# Patient Record
Sex: FEMALE | Race: WHITE
[De-identification: names, ages, dates, MRNs, and addresses within clinical notes are randomized per-mention and may not be internally consistent; named-entity substitution may affect disease eponyms.]

---

## 2019-08-10 ENCOUNTER — HOSPITAL ENCOUNTER (INPATIENT)
Dept: HOSPITAL 92 - ERS | Age: 59
LOS: 3 days | Discharge: HOME | DRG: 66 | End: 2019-08-13
Attending: HOSPITALIST | Admitting: HOSPITALIST
Payer: COMMERCIAL

## 2019-08-10 VITALS — BODY MASS INDEX: 32.1 KG/M2

## 2019-08-10 DIAGNOSIS — H53.462: ICD-10-CM

## 2019-08-10 DIAGNOSIS — Z90.49: ICD-10-CM

## 2019-08-10 DIAGNOSIS — F90.9: ICD-10-CM

## 2019-08-10 DIAGNOSIS — F41.9: ICD-10-CM

## 2019-08-10 DIAGNOSIS — R26.81: ICD-10-CM

## 2019-08-10 DIAGNOSIS — I10: ICD-10-CM

## 2019-08-10 DIAGNOSIS — Z79.899: ICD-10-CM

## 2019-08-10 DIAGNOSIS — G43.909: ICD-10-CM

## 2019-08-10 DIAGNOSIS — E11.65: ICD-10-CM

## 2019-08-10 DIAGNOSIS — E03.9: ICD-10-CM

## 2019-08-10 DIAGNOSIS — I63.531: Primary | ICD-10-CM

## 2019-08-10 DIAGNOSIS — R20.0: ICD-10-CM

## 2019-08-10 DIAGNOSIS — Z79.4: ICD-10-CM

## 2019-08-10 DIAGNOSIS — E66.9: ICD-10-CM

## 2019-08-10 DIAGNOSIS — E78.5: ICD-10-CM

## 2019-08-10 DIAGNOSIS — Z79.84: ICD-10-CM

## 2019-08-10 DIAGNOSIS — Z98.51: ICD-10-CM

## 2019-08-10 LAB
ALBUMIN SERPL BCG-MCNC: 4.4 G/DL (ref 3.5–5)
ALP SERPL-CCNC: 81 U/L (ref 40–150)
ALT SERPL W P-5'-P-CCNC: 23 U/L (ref 8–55)
ANION GAP SERPL CALC-SCNC: 11 MMOL/L (ref 10–20)
AST SERPL-CCNC: 15 U/L (ref 5–34)
BASOPHILS # BLD AUTO: 0.1 THOU/UL (ref 0–0.2)
BASOPHILS NFR BLD AUTO: 0.7 % (ref 0–1)
BILIRUB SERPL-MCNC: 0.4 MG/DL (ref 0.2–1.2)
BUN SERPL-MCNC: 15 MG/DL (ref 9.8–20.1)
CALCIUM SERPL-MCNC: 9.4 MG/DL (ref 7.8–10.44)
CHLORIDE SERPL-SCNC: 101 MMOL/L (ref 98–107)
CO2 SERPL-SCNC: 27 MMOL/L (ref 22–29)
CREAT CL PREDICTED SERPL C-G-VRATE: 0 ML/MIN (ref 70–130)
EOSINOPHIL # BLD AUTO: 0.2 THOU/UL (ref 0–0.7)
EOSINOPHIL NFR BLD AUTO: 1.7 % (ref 0–10)
GLOBULIN SER CALC-MCNC: 2.6 G/DL (ref 2.4–3.5)
GLUCOSE SERPL-MCNC: 305 MG/DL (ref 70–105)
HGB BLD-MCNC: 15.8 G/DL (ref 12–16)
LYMPHOCYTES # BLD: 2.9 THOU/UL (ref 1.2–3.4)
LYMPHOCYTES NFR BLD AUTO: 26.9 % (ref 21–51)
MCH RBC QN AUTO: 31.7 PG (ref 27–31)
MCV RBC AUTO: 93.6 FL (ref 78–98)
MONOCYTES # BLD AUTO: 0.6 THOU/UL (ref 0.11–0.59)
MONOCYTES NFR BLD AUTO: 5.2 % (ref 0–10)
NEUTROPHILS # BLD AUTO: 7.1 THOU/UL (ref 1.4–6.5)
NEUTROPHILS NFR BLD AUTO: 65.4 % (ref 42–75)
PLATELET # BLD AUTO: 251 THOU/UL (ref 130–400)
POTASSIUM SERPL-SCNC: 4.2 MMOL/L (ref 3.5–5.1)
RBC # BLD AUTO: 4.99 MILL/UL (ref 4.2–5.4)
SODIUM SERPL-SCNC: 135 MMOL/L (ref 136–145)
WBC # BLD AUTO: 10.9 THOU/UL (ref 4.8–10.8)

## 2019-08-10 PROCEDURE — 83036 HEMOGLOBIN GLYCOSYLATED A1C: CPT

## 2019-08-10 PROCEDURE — 70551 MRI BRAIN STEM W/O DYE: CPT

## 2019-08-10 PROCEDURE — 36416 COLLJ CAPILLARY BLOOD SPEC: CPT

## 2019-08-10 PROCEDURE — 93880 EXTRACRANIAL BILAT STUDY: CPT

## 2019-08-10 PROCEDURE — 96365 THER/PROPH/DIAG IV INF INIT: CPT

## 2019-08-10 PROCEDURE — 80048 BASIC METABOLIC PNL TOTAL CA: CPT

## 2019-08-10 PROCEDURE — 80061 LIPID PANEL: CPT

## 2019-08-10 PROCEDURE — 80053 COMPREHEN METABOLIC PANEL: CPT

## 2019-08-10 PROCEDURE — 85025 COMPLETE CBC W/AUTO DIFF WBC: CPT

## 2019-08-10 PROCEDURE — 36415 COLL VENOUS BLD VENIPUNCTURE: CPT

## 2019-08-10 PROCEDURE — 96375 TX/PRO/DX INJ NEW DRUG ADDON: CPT

## 2019-08-10 PROCEDURE — 84484 ASSAY OF TROPONIN QUANT: CPT

## 2019-08-10 PROCEDURE — 93306 TTE W/DOPPLER COMPLETE: CPT

## 2019-08-10 PROCEDURE — 70496 CT ANGIOGRAPHY HEAD: CPT

## 2019-08-10 PROCEDURE — 93005 ELECTROCARDIOGRAM TRACING: CPT

## 2019-08-10 NOTE — CT
CTA Angio Head W WO Con

CT brain without contrast



History: Reason For Study



Comparison: None.



Findings: Noncontrast evaluation of the brain demonstrates hypodensity right PCA territory hypodensit
y appears subacute-chronic. No acute hemorrhage.



Calvarium is intact. Large left maxillary sinus mucosal retention cyst.



The intradural right vertebral artery is dominant. Basilar artery is patent. Left P1 segment is atret
ic. Very subtle focal occlusion right P2 segments due to thrombus for length of 3 mm.



The left middle cerebral artery is patent as well as the anterior cerebral arteries. Focal low-grade 
narrowing right M1 segment, 20-30%.



Impression: 

1. Late subacute-chronic right PCA territory infarction.

2. Focal 3 mm thrombosis right P2 segment with adequate peripheral vascular flow.

3. Low-grade 20-30% narrowing right M1 segment.



Code CR: Dr. Loc cross



Reported By: Jamey Arreola 

Electronically Signed:  8/10/2019 7:55 PM

## 2019-08-11 RX ADMIN — HYDROCODONE BITARTRATE AND ACETAMINOPHEN PRN TAB: 5; 325 TABLET ORAL at 18:19

## 2019-08-11 RX ADMIN — HYDROCODONE BITARTRATE AND ACETAMINOPHEN PRN TAB: 5; 325 TABLET ORAL at 22:28

## 2019-08-11 RX ADMIN — ONDANSETRON PRN MG: 2 INJECTION INTRAMUSCULAR; INTRAVENOUS at 21:47

## 2019-08-11 RX ADMIN — HYDROCODONE BITARTRATE AND ACETAMINOPHEN PRN TAB: 5; 325 TABLET ORAL at 14:45

## 2019-08-11 RX ADMIN — ONDANSETRON PRN MG: 2 INJECTION INTRAMUSCULAR; INTRAVENOUS at 14:45

## 2019-08-11 RX ADMIN — INSULIN HUMAN PRN UNIT: 100 INJECTION, SOLUTION PARENTERAL at 06:37

## 2019-08-11 RX ADMIN — INSULIN LISPRO PRN UNIT: 100 INJECTION, SOLUTION INTRAVENOUS; SUBCUTANEOUS at 18:08

## 2019-08-11 RX ADMIN — INSULIN LISPRO PRN UNIT: 100 INJECTION, SOLUTION INTRAVENOUS; SUBCUTANEOUS at 21:25

## 2019-08-11 RX ADMIN — HYDROCODONE BITARTRATE AND ACETAMINOPHEN PRN TAB: 5; 325 TABLET ORAL at 12:22

## 2019-08-11 RX ADMIN — INSULIN HUMAN PRN UNIT: 100 INJECTION, SOLUTION PARENTERAL at 12:22

## 2019-08-11 NOTE — ULT
BILATERAL CAROTID DUPLEX ULTRASOUND WITH SPECTRAL ANALYSIS AND COLOR FLOW EVALUATION:

 

HISTORY: 

CVA.

 

FINDINGS: 

Gray scale, color flow, Doppler evaluation, and spectral analysis of the bilateral carotid arteries i
s performed with 2D imaging.

 

There is a suggestion of mild atherosclerotic plaque involving the left carotid bulb.  

 

There is less than 50% maximal stenosis in the bilateral internal carotid arteries based on peak syst
olic velocities and ICA/CCA ratios.  The peak systolic velocity in the right ICA is 103.1 cm/s with a
n ICA/CCA ratio of 0.86.  Peak systolic velocity in the left ICA Is 97 cm/s with an ICA/CCA ratio of 
0.78.

 

There is an elevated peak systolic velocity in the left internal carotid artery suggesting stenosis.

 

Antegrade flow is demonstrated.

 

Antegrade flow is demonstrated in the vertebral arteries bilaterally.

 

IMPRESSION: 

No hemodynamically significant stenosis in the bilateral internal carotid arteries.

 

POS: [facility]

## 2019-08-11 NOTE — CON
DATE OF CONSULTATION:  2019



This consult is performed via telemedicine, registered nurse accompanying the

physician is Rosa Isela. 



CHIEF COMPLAINT:  Headache.



HISTORY OF PRESENT ILLNESS:  The patient reports she has had a headache about 2

weeks ago and she has been waiting at home before she sought further medical

attention.  She developed left face, arm, and leg numbness last Saturday, and it is

still numb.  She thought it was a migraine.  She took Excedrin Migraine, which did

not help.  She then took Advil.  On Friday, she received rizatriptan prescribed by

her family doctor and he told her that if her symptoms do not resolve to come back

to the hospital.  The patient developed vision problems.  She feels her vision is

like a kaleidoscope, particularly on the left side.  She worsened since Thursday and

then, her vision symptoms changed.  She is having strobing and flashes in her vision

on the left side.  She has problems walking. 



PREVIOUS MEDICAL HISTORY:  She had one other episode of migraine 30 years ago, which

lasted for 2 days after childbirth and this was following an epidural steroid

injection for procedure for childbirth.  Her previous medical history is also

significant for diabetes and she is monitoring her blood sugars regularly and she

has hypothyroidism, anxiety, attention deficit disorder, and obesity. 



PAST SURGICAL HISTORY:  Cholecystectomy, sinus surgery, and tubal ligation.



FAMILY HISTORY:  Her mother has hypercholesterolemia and hypertension.  She is 78.

Father  at 79 from COPD.  Her son is 30, he is healthy and no strokes in the

family. 



SOCIAL HISTORY:  She lives with her family.  She drinks alcohol maybe once a week

and she does not smoke.  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

She does have a high stress job, but she is generally not anxious or stressed in

life. 



MEDICATIONS:  At home;

1. Metformin.

2. Levothyroxine.

3. Citalopram.

4. Adderall.

5. Insulin.

6. NSAID.



ALLERGIES:  NO KNOWN DRUG ALLERGIES.



REVIEW OF SYSTEMS:  PULMONARY:  Negative for shortness of breath and cough. 

GI:  Negative for diarrhea, vomiting, and nausea. 

CARDIAC:  Negative for chest pain. 

NEUROLOGIC:  Positive for headache, numbness, and vision symptoms. 

ENDOCRINE:  Positive for thyroid dysfunction. 

DERMATOLOGIC:  Negative for any rash. 

OPHTHALMOLOGIC:  Positive for vision symptoms.



LABORATORY WORKUP:  White count 10.9, hemoglobin 15.8, hematocrit 46.7, and

platelets 251.  Chemistry; sodium 135, potassium 4.2, chloride 101, bicarb 27, BUN

15, creatinine 1.01, and glucose 305.  Trend in her glucose has been hyperglycemic

throughout this visit and her CT of the head and CT of the [place] with

angiogram was reported she has late subacute chronic right PCA territory infarction

and focal 3 mm thrombosis in the right P2 segment and low-grade 20% to 30% narrowing

of the right M1 segment. 



PHYSICAL EXAMINATION:

VITAL SIGNS:  Blood pressure was 166/89, pulse 90, temperature 98.2, and O2

saturations 94. 

GENERAL APPEARANCE:  Well-built, well-nourished lady. 

CHEST:  Clear vesicular breathing. 

CARDIOVASCULAR:  S1 and S2 heard.  No murmurs. 

ABDOMEN:  Soft. 

NEUROLOGIC:  Higher intellectual functions, normal orientation to time, place, and

person.  Cranial nerves 2 through 12, normal visual fields on the right side. She

had deficit in the left inferior quadrant in the left eye only in the two segments

and normal sensation of face bilaterally.  No facial asymmetry.  Normal hearing

bilaterally.  Tongue midline.  No atrophy noted.  Motor, bulk normal.  Tone normal.

Strength 5/5 in both upper and lower extremities.  Muscle groups tested are

iliopsoas, hamstrings, quadriceps, ankle dorsiflexion, plantar flexion, deltoid,

biceps, triceps, wrist extension and flexion, finger extension and flexion

bilaterally.  Deep tendon reflexes 2+ throughout.  Sensory normal to touch

bilaterally except for left arm and leg and cerebellar, normal finger-to-nose and

heel-to-shin. 



IMPRESSION:  The patient is a 59-year-old lady with history of sudden onset of

headache and visual changes for the last 2 weeks, and she stated at home thinking

this is a migraine and has continued to have symptoms.  She describes her left eye

vision as a kaleidoscope and she is also having some mild deficits with her balance.

 Her current examination shows visual field cut in the lower quadrant of the left

eye in both temporal and nasal sections on confrontation method.  Rest of the

neurological exam is normal except for numbness.  Localization for her stroke would

be in the left and right PCA and the expected evolution of her symptoms of stroke,

evolution of her ischemia. 



RECOMMENDATIONS:  I will request an MRI of the brain to establish the diagnosis and

if you need any further help, please contact Dr. Blake, I am off call from

tomorrow. 







Job ID:  [id number]

## 2019-08-11 NOTE — HP
PRIMARY CARE PHYSICIAN:  __________ out of town physician.



CHIEF COMPLAINT:  Persistent right-sided headache and visual changes.



HISTORY OF PRESENT ILLNESS:  A 59-year-old female with known history of diabetes,

who came in due to persistent headache associated with visual changes.  The patient

reportedly developed severe right frontotemporal headache about 2 weeks ago, for

which she has been taking NSAIDs for relief.  A week after onset of headaches, the

patient had an episode of acute onset of left-sided numbness, which soon resolved

but has happened intermittently for 3 more times.  About the same time, also the

patient started having visual changes, which she describes as seeing kaleidoscopes

with some flashes of light.  The patient had contacted PCP's office, but could not

see the PCP a day prior to presentation.  She was initially treated for possible

migraine and was asked to take her antihypertensives.  Due to persistent of

symptoms, she was finally seen by PCP on August 9th and prescription of __________

was given while arrangement for MRI was made for August 12th.  However, due to

persistent of symptoms with worsening visual changes, the patient presented to the

emergency room, where she was noticed to have a right posterior circulation artery

infarct.  The patient continues to have constricted visual field as well as flashes

of light and some visual hallucinations as well as left facial numbness.  There was

no associated limb weakness or facial droop.  The patient also denied chest pain,

palpitations, nausea, vomiting, abdominal pain, dysuria, hematuria, or leg swelling.

 Dull light temporal headache persists, which she rates as 8/10. 



PAST MEDICAL HISTORY:  

1. Type 2 diabetes.

2. Hypothyroidism.

3. Anxiety.

4. Attention deficit hyperactivity disorder.

5. Questionable hypertension.

6. Obesity.



PAST SURGICAL HISTORY:  

1. Cholecystectomy.

2. Sinus surgery.

3. Tubal ligation.



FAMILY HISTORY:  Significant for hypertension and hyperlipidemia in mother, and

COPD, coronary artery disease, and peripheral artery disease in father. 



SOCIAL HISTORY:  The patient lives with spouse and family.  Occasionally takes

alcohol, but denied smoking or recreational drug use.  The patient wants to be full

code, and spouse is the surrogate decision maker.  The patient is a full-time

worker.  Works with [employer]. 



ALLERGIES:  NO KNOWN DRUG ALLERGIES REPORTED.



HOME MEDICATIONS:  The patient is on the following, but the doses and frequency

could not be ascertained at this time. 

1. Metformin.

2. Levothyroxine.

3. Citalopram.

4. Adderall.

5. Insulin 75/25, 52 units in the morning and 52 units in the evening.

6. An unknown antihypertensive.

7. NSAIDs.



REVIEW OF SYSTEMS:  A 12-point review of system performed was negative other than

pertinent positives and negatives included in the history of present illness. 



PHYSICAL EXAMINATION:

VITAL SIGNS:  Temperature 98.2, pulse 90, respiratory rate 18, SpO2 of 94 on room

air, blood pressure is 166/89. 

GENERAL:  Middle-aged female, in mild-to-moderate painful distress.  Afebrile.

Anicteric.  Acyanotic. 

HEENT:  Normocephalic and atraumatic.  Pupils are equal and reacting to light.  Oral

mucosa is moist. 

NECK:  Supple, nontender, with full range of motion.  No masses or lymphadenopathy

appreciated. 

CARDIOVASCULAR:  Regular rhythm and rate with normal heart sounds 1 and 2.  3/6

systolic murmur is noted. 

RESPIRATORY:  Good air entry bilaterally with no obvious crackle or rhonchi or use

of accessory muscles. 

GI:  Obese, soft, nontender, nondistended with normal bowel sounds. 

EXTREMITIES:  Grossly normal looking, atraumatic with no edema, erythema, or

cyanosis.  Distal pulses are palpable. 

NEUROLOGIC:  Conscious and alert, oriented x3, with appropriate mental status.  Face

is symmetrical with no facial droop.  Cranial nerves 2 through 12 are grossly intact

except visual field changes.  Left homonymous hemianopia noted.  Power is 5/5 in all

extremities.  Sensation is grossly symmetrical in both lower limbs and upper limbs

as well as trunk.  Sensation however is mildly decreased on the left side of face.

Tone and reflexes are normal and symmetrical. 

PSYCHIATRIC:  Normal affect with normal behavior and good insight.



DIAGNOSTIC DATA:  CBC showed WBC count of 10.9, hemoglobin of 15.8, MCV of 93.6,

platelets of 251. 



BMP showed sodium of 135, potassium 4.2, chloride 101, CO2 of 27, anion gap 11, BUN

15, creatinine 1.01, glucose 305, calcium 9.4, total bilirubin 0.4, AST 15, ALT 23,

alkaline phosphatase 81, total protein 7.0, albumin 4.4, globulin 2.6. 



Initial troponin is 0.012. 



EKG showed normal sinus rhythm with rate of 75.  No obvious ischemic changes were

noted. 



CT scan of the brain showed subacute to chronic right PCA territory infarction. 



CT angio head with and without contrast showed focal 3 mm thrombosis of the right P2

segment with adequate peripheral vascular flow as well as low grade 20% to 30%

narrowing of right M1 segment.  Incidentally, large left maxillary sinus mucosal

retention cyst was noted. 



ASSESSMENT:  

1. Subacute right PCA territory infarct.

2. Left homonymous hemianopia.

3. Moderate to severe headache.

4. Heart murmur.

5. Large left maxillary sinus mucosal retention cyst.

6. Hypertension, most likely reactive, related to acute cerebrovascular infarct.

7. Type 2 diabetes mellitus with hyperglycemia.

8. Hypothyroidism.

9. Obesity.



PLAN:  

1. We will start analgesics with Ketorolac to get adequate headache control.

2. We will allow permissive hypertension.

3. We will start aspirin and statin.

4. DVT prophylaxis with Lovenox will be commenced.

5. Insulin therapy to get adequate blood sugar control will be commenced as well.

6. We will get carotid Doppler as well as echocardiogram of the heart.

7. We will consult Neurology.

8. PT, OT, and Speech consults have been requested.

9. We will continue neuro checks.  We will get lipid panel in the morning as well as

repeat BMP, given NSAID use.  Code status is full code.  The patient's spouse is the

surrogate decision maker.  It is anticipated that the patient will be in hospital

for more than two midnights. 







Job ID:  [id number]

## 2019-08-12 LAB
ANION GAP SERPL CALC-SCNC: 12 MMOL/L (ref 10–20)
BASOPHILS # BLD AUTO: 0.1 THOU/UL (ref 0–0.2)
BASOPHILS NFR BLD AUTO: 0.5 % (ref 0–1)
BUN SERPL-MCNC: 17 MG/DL (ref 9.8–20.1)
CALCIUM SERPL-MCNC: 9.1 MG/DL (ref 7.8–10.44)
CHD RISK SERPL-RTO: 3.4 (ref ?–4.5)
CHLORIDE SERPL-SCNC: 101 MMOL/L (ref 98–107)
CHOLEST SERPL-MCNC: 127 MG/DL
CO2 SERPL-SCNC: 26 MMOL/L (ref 22–29)
CREAT CL PREDICTED SERPL C-G-VRATE: 121 ML/MIN (ref 70–130)
EOSINOPHIL # BLD AUTO: 0.1 THOU/UL (ref 0–0.7)
EOSINOPHIL NFR BLD AUTO: 0.6 % (ref 0–10)
GLUCOSE SERPL-MCNC: 259 MG/DL (ref 70–105)
HDLC SERPL-MCNC: 37 MG/DL
HGB BLD-MCNC: 14.2 G/DL (ref 12–16)
LDLC SERPL CALC-MCNC: 64 MG/DL
LYMPHOCYTES # BLD: 3.8 THOU/UL (ref 1.2–3.4)
LYMPHOCYTES NFR BLD AUTO: 32.9 % (ref 21–51)
MCH RBC QN AUTO: 30.6 PG (ref 27–31)
MCV RBC AUTO: 92.9 FL (ref 78–98)
MONOCYTES # BLD AUTO: 0.5 THOU/UL (ref 0.11–0.59)
MONOCYTES NFR BLD AUTO: 4.2 % (ref 0–10)
NEUTROPHILS # BLD AUTO: 7.1 THOU/UL (ref 1.4–6.5)
NEUTROPHILS NFR BLD AUTO: 61.8 % (ref 42–75)
PLATELET # BLD AUTO: 230 THOU/UL (ref 130–400)
POTASSIUM SERPL-SCNC: 4.3 MMOL/L (ref 3.5–5.1)
RBC # BLD AUTO: 4.65 MILL/UL (ref 4.2–5.4)
SODIUM SERPL-SCNC: 135 MMOL/L (ref 136–145)
TRIGL SERPL-MCNC: 130 MG/DL (ref ?–150)
WBC # BLD AUTO: 11.5 THOU/UL (ref 4.8–10.8)

## 2019-08-12 RX ADMIN — INSULIN LISPRO PRN UNIT: 100 INJECTION, SOLUTION INTRAVENOUS; SUBCUTANEOUS at 13:49

## 2019-08-12 RX ADMIN — HYDROCODONE BITARTRATE AND ACETAMINOPHEN PRN TAB: 5; 325 TABLET ORAL at 19:25

## 2019-08-12 RX ADMIN — HYDROCODONE BITARTRATE AND ACETAMINOPHEN PRN TAB: 5; 325 TABLET ORAL at 15:09

## 2019-08-12 RX ADMIN — HYDROCODONE BITARTRATE AND ACETAMINOPHEN PRN TAB: 5; 325 TABLET ORAL at 06:37

## 2019-08-12 RX ADMIN — HYDROCODONE BITARTRATE AND ACETAMINOPHEN PRN TAB: 5; 325 TABLET ORAL at 23:29

## 2019-08-12 RX ADMIN — INSULIN LISPRO PRN UNIT: 100 INJECTION, SOLUTION INTRAVENOUS; SUBCUTANEOUS at 06:38

## 2019-08-12 RX ADMIN — HYDROCODONE BITARTRATE AND ACETAMINOPHEN PRN TAB: 5; 325 TABLET ORAL at 10:58

## 2019-08-12 RX ADMIN — INSULIN LISPRO PRN UNIT: 100 INJECTION, SOLUTION INTRAVENOUS; SUBCUTANEOUS at 17:20

## 2019-08-12 RX ADMIN — HYDROCODONE BITARTRATE AND ACETAMINOPHEN PRN TAB: 5; 325 TABLET ORAL at 02:34

## 2019-08-12 NOTE — PDOC.HOSPP
- Subjective


Encounter Date: 08/12/19


Encounter Time: 10:58


Subjective: 


58 y/o female admitted with acute visual changes and headache. CT and MRI 

showed acute posterior cerebral atery are infarction. Headache hs improved but 

visual defect persist. No new problem.





- Objective


Vital Signs & Weight: 


 Vital Signs (12 hours)











  Temp Pulse Pulse Pulse Resp BP BP


 


 08/12/19 11:58  97.4 F L  73    16  


 


 08/12/19 09:48    61  72   146/78 H  184/81 H


 


 08/12/19 09:46    61  72   146/78 H  184/81 H


 


 08/12/19 09:25  97.6 F  64    16  


 


 08/12/19 08:00       


 


 08/12/19 04:00  97.9 F  92    16  














  BP Pulse Ox


 


 08/12/19 11:58  128/65  93 L


 


 08/12/19 09:48  


 


 08/12/19 09:46  


 


 08/12/19 09:25  137/71  98


 


 08/12/19 08:00   98


 


 08/12/19 04:00  146/76 H  98








 Weight











Weight                         220 lb 12.8 oz














I&O: 


 











 08/11/19 08/12/19 08/13/19





 06:59 06:59 06:59


 


Intake Total  980 


 


Balance  980 











Result Diagrams: 


 08/12/19 05:21





 08/12/19 05:21


Additional Labs: 


 Accuchecks











  08/12/19 08/12/19 08/11/19





  10:58 06:16 20:50


 


POC Glucose  266 H  279 H  344 H














  08/11/19





  17:11


 


POC Glucose  366 H














ROS





- Medication


Medications: 


Active Medications











Generic Name Dose Route Start Last Admin





  Trade Name Freq  PRN Reason Stop Dose Admin


 


Hydrocodone Bitart/Acetaminophen  1 tab  08/11/19 09:19  08/12/19 02:34





  Hoffman 5/325  PO   1 tab





  Q4H PRN   Administration





  Moderate Pain (4-6)   





     





     





     


 


Hydrocodone Bitart/Acetaminophen  2 tab  08/11/19 14:27  08/12/19 10:58





  Hoffman 5/325  PO   2 tab





  Q4H PRN   Administration





  Severe Pain (7-10)   





     





     





     


 


Aspirin  325 mg  08/12/19 09:00  08/12/19 09:28





  Ecotrin  PO   325 mg





  DAILY CHANDAN   Administration





     





     





     





     


 


Atorvastatin Calcium  40 mg  08/11/19 21:00  08/11/19 21:23





  Lipitor  PO   40 mg





  HS CHANDAN   Administration





     





     





     





     


 


Enoxaparin Sodium  40 mg  08/12/19 09:00  08/12/19 09:28





  Lovenox  SC   40 mg





  0900 CHANDAN   Administration





     





     





     





     


 


Famotidine  20 mg  08/11/19 21:00  08/12/19 09:28





  Pepcid  PO   20 mg





  BID CHANDAN   Administration





     





     





     





     


 


Insulin Glargine 30 units/  0.3 mls @ 0.3 mls/hr  08/12/19 09:00  08/12/19 09:27





  Miscellaneous Medication  SC   0.3 mls





  QAM CHANDAN   Administration





     





     





     





     


 


Losartan Potassium  50 mg  08/12/19 09:00  08/12/19 09:28





  Cozaar  PO   50 mg





  DAILY CHANDAN   Administration





     





     





     





     


 


Ondansetron HCl  4 mg  08/11/19 09:19  08/11/19 21:47





  Zofran  IVP   4 mg





  Q6H PRN   Administration





  Nausea/Vomiting   





     





     





     


 


Sodium Chloride  10 ml  08/11/19 09:19  08/11/19 21:23





  Flush - Normal Saline  IVF   10 ml





  PRN PRN   Administration





  Saline Flush   





     





     





     














- Exam


awake alert


Eye: PERRL, anicteric sclera


Eye - other findings: left homonymous inferior quadrantopia. 


ENT: moist mucosa


Neck: supple, symmetric, no JVD


Heart: RRR


Respiratory: CTAB, no wheezes, no rales, no ronchi


Gastrointestinal: soft, non-tender, non-distended, normal bowel sounds (obese)


Extremities: no cyanosis, no edema


Neurological: CN's grossly intact (Except isual field defect.), no weakness


Musculoskeletal: normal tone, normal strength, no muscle wasting


Psychiatric: normal affect, A&O x 3





Hosp A/P


(1) Acute ischemic right posterior cerebral artery (PCA) stroke


Code(s): I63.531 - CEREB INFRC D/T UNSP OCCLS OR STENOS OF RIGHT POST CEREB ART

   Status: Acute   





(2) Homonymous bilateral field defects of left side


Code(s): H53.462 - HOMONYMOUS BILATERAL FIELD DEFECTS, LEFT SIDE   Status: 

Acute   





(3) Uncontrolled diabetes mellitus


Code(s): E11.65 - TYPE 2 DIABETES MELLITUS WITH HYPERGLYCEMIA   Status: Acute   





(4) HTN (hypertension)


Code(s): I10 - ESSENTIAL (PRIMARY) HYPERTENSION   Status: Acute   





(5) Dyslipidemia


Code(s): E78.5 - HYPERLIPIDEMIA, UNSPECIFIED   Status: Acute   





- Plan


Increase lantus to 40 units daily. 


Continue sliding scale insulin.


Will restart metformin tomorrow


Continue ASA and statin.


Awaitin Neurology re evaluattion.


Continue PT/OT.


Consult case ,mgt for rehab placement in line with PT recommendadtion.

## 2019-08-13 VITALS — SYSTOLIC BLOOD PRESSURE: 148 MMHG | DIASTOLIC BLOOD PRESSURE: 77 MMHG | TEMPERATURE: 97.8 F

## 2019-08-13 RX ADMIN — HYDROCODONE BITARTRATE AND ACETAMINOPHEN PRN TAB: 5; 325 TABLET ORAL at 07:48

## 2019-08-13 RX ADMIN — HYDROCODONE BITARTRATE AND ACETAMINOPHEN PRN TAB: 5; 325 TABLET ORAL at 03:34

## 2019-08-13 RX ADMIN — INSULIN LISPRO PRN UNIT: 100 INJECTION, SOLUTION INTRAVENOUS; SUBCUTANEOUS at 07:10

## 2019-08-13 NOTE — DIS
DATE OF ADMISSION:  08/10/2019



DATE OF DISCHARGE:  08/13/2019



PRIMARY CARE PHYSICIAN:  Out of town physician.



DISCHARGE DIAGNOSES:  

1. Acute ischemic right posterior cerebral artery stroke.

2. Homonymous bilateral field defects of the left side.

3. Uncontrolled diabetes mellitus with hemoglobin A1c of 11.3.

4. Hypertension.

5. Dyslipidemia.

6. Gait instability.

7. Obesity.

8. Pseudohyponatremia due to hyperglycemia



CONSULTS:  Neurology.



HOSPITAL COURSE:  A 59-year-old female with known history of hypertension and

diabetes, admitted with acute visual changes and persistent headache.  CT scan 
of

the brain performed in the emergency room showed acute posterior cerebral artery

area infarct.  This was further confirmed with MRI of the brain.  Neurology 
consult

was obtained and physical and occupational therapy as well as rehabilitation was

recommended given gait instability.  However, the patient declined acute rehab,

preferring to go home.  She however was able to ambulate, but due to visual 
field

deficit she tends to stumble a little bit.  She was also found to have 
uncontrolled

diabetes with hemoglobin A1c of  10.8 hence insulin therapy was adjusted 
appropriately

with improvement in diabetic control. 



PHYSICAL EXAMINATION:

VITAL SIGNS:  Temperature 97.8, pulse 57, respiratory rate 18, SpO2 of 99% on 
room

air.  Blood pressure is 148/77. 

GENERAL:  Middle-aged female, in no obvious distress.  Afebrile.  Anicteric.

Acyanotic. 

HEENT:  Normocephalic, atraumatic.  Pupils are reacting to light. 

CARDIOVASCULAR:  Regular rhythm and rate with normal heart sounds one and two.

Systolic murmur is noted. 

RESPIRATORY:  Good air entry bilaterally with no obvious crackle or rhonchi or 
use

of accessory muscles. 

GI:  Obese, soft, nontender, nondistended with normal bowel sounds. 

EXTREMITIES:  Grossly normal looking atraumatic with no edema or erythema. 

NEUROLOGIC:  Cranial nerves 2 through 12 are grossly intact except visual field

loss.  The patient has left homonymous inferior quadrantanopia.  Cranial nerve;

power is 5/5 in all limbs.  The patient is ambulant. 



DISCHARGE CONDITION:  Stable.



DISCHARGE DISPOSITION:  Home.



FOLLOWUP:  

1. Follow up with PCP in 1 week.

2. Follow up with ophthalmologist of her choice in 1 to 2 weeks.

3. Follow up with outpatient PT and OT.



DISCHARGE MEDICATIONS:  

1. Citalopram 20 mg p.o. daily.

2. Losartan 100 mg p.o. daily.

3. Metformin 1000 mg p.o. b.i.d.

4. Acetaminophen 650 mg p.o. q.4 p.r.n. for pain.

5. Aspirin 325 mg p.o. daily.

6. Lipitor 40 mg p.o. daily at bedtime.

7. Insulin 50 units subcutaneously daily.

8. Insulin lispro 0 to 13 units subcutaneously with meal for hyperglycemia 
according

to providing sliding scale. 

9. Tramadol 50 to 100 mg p.o. q.6 p.r.n. for headache.



TIME SPENT:  This discharge took more than 35 minutes.







Job ID:  [id number]



MTDD

## 2019-08-14 NOTE — PQF
[employer] [facility]      
                                      DAMARIS LUCIO

[id number]                                                             [facility]

[id number]                             

                                   

CLINICAL DOCUMENTATION CLARIFICATION FORM:  POST DISCHARGE



Addendum to original discharge summary date:  __________________________________
____



Late entry note date:  _________________________________________________________
__











DATE:           08/14/2019             ATTN: DAMARIS LUCIO



Please exercise your independent, professional judgment in responding to the 
clarification form. 

Clinical indicators are provided on the bottom of this form for your review



Please check appropriate box(s):

[  ] Hyponatremia  please specify etiology, if known ____________________ 

[  ] Hyponatremia due to SIADH (Syndrome of Inappropriate Secretion of 
Antidiuretic Hormone) 

[ X ] Other diagnosis Pseudohyponatremia due to hyperglycemia

[  ] Unable to determine



CLINICAL INDICATORS - SIGNS / SYMPTOMS / LABS

Headache with visual changes - Documented in H&P on 08/10/19 by DAMARIS LUCIO

left side numbness - Documented in H&P on 08/10/19 by DAMARIS LUCIO

Sodium of 135 - Documented in H&P on 08/10/19 by DAMARIS LUCIO



RISK FACTORS

DM - documented in Discharge summary on 08/13/19 by DAMARIS LUCIO

Acute ischemic cerebral artery stroke - documented in Discharge summary on 08/13
/19 by DAMARIS LUCIO





TREATMENTS:

Sodium Chloride 0.9% 10 ml IVF - Documented in Medication list







(This form is maintained as a part of the permanent medical record)

2015 [employer].  All Rights Reserved

[email or web address]    [not 
provided]

                                                              



MTDD

## 2019-08-17 NOTE — EKG
Test Reason : 

Blood Pressure : ***/*** mmHG

Vent. Rate : 075 BPM     Atrial Rate : 075 BPM

   P-R Int : 190 ms          QRS Dur : 092 ms

    QT Int : 422 ms       P-R-T Axes : 065 013 068 degrees

   QTc Int : 471 ms

 

Normal sinus rhythm

Incomplete right bundle branch block

Borderline ECG

 

Confirmed by JANETTE FAUST, TIMMY ([id number]),  ZACHARY PARADA ([id number]) on 8/17/2019 12:45:57 PM

 

Referred By:             Confirmed By:TIMMY SAVAGE MD

## 2020-11-24 ENCOUNTER — HOSPITAL ENCOUNTER (INPATIENT)
Dept: HOSPITAL 92 - ERS | Age: 60
LOS: 1 days | Discharge: HOME | DRG: 66 | End: 2020-11-25
Attending: INTERNAL MEDICINE | Admitting: INTERNAL MEDICINE
Payer: SELF-PAY

## 2020-11-24 VITALS — BODY MASS INDEX: 31.1 KG/M2

## 2020-11-24 DIAGNOSIS — Z86.73: ICD-10-CM

## 2020-11-24 DIAGNOSIS — F90.9: ICD-10-CM

## 2020-11-24 DIAGNOSIS — Z98.51: ICD-10-CM

## 2020-11-24 DIAGNOSIS — E03.9: ICD-10-CM

## 2020-11-24 DIAGNOSIS — E11.9: ICD-10-CM

## 2020-11-24 DIAGNOSIS — Z79.82: ICD-10-CM

## 2020-11-24 DIAGNOSIS — G83.14: ICD-10-CM

## 2020-11-24 DIAGNOSIS — R27.0: ICD-10-CM

## 2020-11-24 DIAGNOSIS — R29.702: ICD-10-CM

## 2020-11-24 DIAGNOSIS — Z79.890: ICD-10-CM

## 2020-11-24 DIAGNOSIS — F41.9: ICD-10-CM

## 2020-11-24 DIAGNOSIS — I63.521: Primary | ICD-10-CM

## 2020-11-24 DIAGNOSIS — Z90.49: ICD-10-CM

## 2020-11-24 DIAGNOSIS — Z28.21: ICD-10-CM

## 2020-11-24 DIAGNOSIS — Z20.828: ICD-10-CM

## 2020-11-24 DIAGNOSIS — Z79.899: ICD-10-CM

## 2020-11-24 DIAGNOSIS — Z79.84: ICD-10-CM

## 2020-11-24 DIAGNOSIS — I10: ICD-10-CM

## 2020-11-24 LAB
ALBUMIN SERPL BCG-MCNC: 4.3 G/DL (ref 3.5–5)
ALP SERPL-CCNC: 63 U/L (ref 40–110)
ALT SERPL W P-5'-P-CCNC: 30 U/L (ref 8–55)
ANION GAP SERPL CALC-SCNC: 14 MMOL/L (ref 10–20)
APTT PPP: 26.8 SEC (ref 22.9–36.1)
AST SERPL-CCNC: 36 U/L (ref 5–34)
BASOPHILS # BLD AUTO: 0.1 THOU/UL (ref 0–0.2)
BASOPHILS NFR BLD AUTO: 0.8 % (ref 0–1)
BILIRUB SERPL-MCNC: 0.5 MG/DL (ref 0.2–1.2)
BUN SERPL-MCNC: 13 MG/DL (ref 9.8–20.1)
CALCIUM SERPL-MCNC: 9.6 MG/DL (ref 7.8–10.44)
CHLORIDE SERPL-SCNC: 101 MMOL/L (ref 98–107)
CO2 SERPL-SCNC: 26 MMOL/L (ref 22–29)
CREAT CL PREDICTED SERPL C-G-VRATE: 0 ML/MIN (ref 70–130)
EOSINOPHIL # BLD AUTO: 0.1 THOU/UL (ref 0–0.7)
EOSINOPHIL NFR BLD AUTO: 1.4 % (ref 0–10)
GLOBULIN SER CALC-MCNC: 2.9 G/DL (ref 2.4–3.5)
GLUCOSE SERPL-MCNC: 285 MG/DL (ref 70–105)
GLUCOSE UR STRIP-MCNC: >=1000 MG/DL
HGB BLD-MCNC: 15.5 G/DL (ref 12–16)
INR PPP: 1
LYMPHOCYTES # BLD: 1.9 THOU/UL (ref 1.2–3.4)
LYMPHOCYTES NFR BLD AUTO: 19 % (ref 21–51)
MCH RBC QN AUTO: 32.4 PG (ref 27–31)
MCV RBC AUTO: 92.7 FL (ref 78–98)
MONOCYTES # BLD AUTO: 0.5 THOU/UL (ref 0.11–0.59)
MONOCYTES NFR BLD AUTO: 4.7 % (ref 0–10)
NEUTROPHILS # BLD AUTO: 7.5 THOU/UL (ref 1.4–6.5)
NEUTROPHILS NFR BLD AUTO: 74.2 % (ref 42–75)
PLATELET # BLD AUTO: 234 THOU/UL (ref 130–400)
POTASSIUM SERPL-SCNC: 4.2 MMOL/L (ref 3.5–5.1)
PROTHROMBIN TIME: 12.8 SEC (ref 12–14.7)
RBC # BLD AUTO: 4.79 MILL/UL (ref 4.2–5.4)
SODIUM SERPL-SCNC: 137 MMOL/L (ref 136–145)
SP GR UR STRIP: 1.05 (ref 1–1.04)
WBC # BLD AUTO: 10.2 THOU/UL (ref 4.8–10.8)

## 2020-11-24 PROCEDURE — 84443 ASSAY THYROID STIM HORMONE: CPT

## 2020-11-24 PROCEDURE — 81003 URINALYSIS AUTO W/O SCOPE: CPT

## 2020-11-24 PROCEDURE — 70496 CT ANGIOGRAPHY HEAD: CPT

## 2020-11-24 PROCEDURE — 85730 THROMBOPLASTIN TIME PARTIAL: CPT

## 2020-11-24 PROCEDURE — 84484 ASSAY OF TROPONIN QUANT: CPT

## 2020-11-24 PROCEDURE — 93005 ELECTROCARDIOGRAM TRACING: CPT

## 2020-11-24 PROCEDURE — A9579 GAD-BASE MR CONTRAST NOS,1ML: HCPCS

## 2020-11-24 PROCEDURE — 36416 COLLJ CAPILLARY BLOOD SPEC: CPT

## 2020-11-24 PROCEDURE — 85610 PROTHROMBIN TIME: CPT

## 2020-11-24 PROCEDURE — 71045 X-RAY EXAM CHEST 1 VIEW: CPT

## 2020-11-24 PROCEDURE — 80053 COMPREHEN METABOLIC PANEL: CPT

## 2020-11-24 PROCEDURE — 87635 SARS-COV-2 COVID-19 AMP PRB: CPT

## 2020-11-24 PROCEDURE — 80061 LIPID PANEL: CPT

## 2020-11-24 PROCEDURE — 70553 MRI BRAIN STEM W/O & W/DYE: CPT

## 2020-11-24 PROCEDURE — 94760 N-INVAS EAR/PLS OXIMETRY 1: CPT

## 2020-11-24 PROCEDURE — 83036 HEMOGLOBIN GLYCOSYLATED A1C: CPT

## 2020-11-24 PROCEDURE — U0003 INFECTIOUS AGENT DETECTION BY NUCLEIC ACID (DNA OR RNA); SEVERE ACUTE RESPIRATORY SYNDROME CORONAVIRUS 2 (SARS-COV-2) (CORONAVIRUS DISEASE [COVID-19]), AMPLIFIED PROBE TECHNIQUE, MAKING USE OF HIGH THROUGHPUT TECHNOLOGIES AS DESCRIBED BY CMS-2020-01-R: HCPCS

## 2020-11-24 PROCEDURE — 70498 CT ANGIOGRAPHY NECK: CPT

## 2020-11-24 PROCEDURE — 85025 COMPLETE CBC W/AUTO DIFF WBC: CPT

## 2020-11-24 PROCEDURE — 93306 TTE W/DOPPLER COMPLETE: CPT

## 2020-11-24 PROCEDURE — 36415 COLL VENOUS BLD VENIPUNCTURE: CPT

## 2020-11-24 NOTE — RAD
ONE VIEW CHEST:

11/24/20

 

HISTORY: 

Dizziness, headache, nausea. Altered mental status. 

 

COMPARISON: 

None.

 

FINDINGS: 

Cardiac silhouette and pulmonary vasculature are within normal limits. Lungs are clear. Osseous struc
tures have a normal appearance. 

 

IMPRESSION: 

No acute cardiopulmonary process.

 

POS: [facility]

## 2020-11-24 NOTE — MRI
MRI BRAIN WITH AND WITHOUT CONTRAST:



DATE:

11/24/2020



HISTORY:

60-year-old female with acute CVA. Dizziness. Left-sided weakness.



COMPARISON:

8/11/2019



TECHNIQUE:

Multiplanar, multisequence MRI of the brain performed pre- and post-IV injection of gadolinium based 
contrast agent.



FINDINGS:

There is a new finding of multifocal patchy small T2 and FLAIR hyperintensities along a long strip of
 right paramedian upper and anterior frontal lobe parenchyma, including superior frontal gyrus. A

small lesion involves the right paracentral lobule. All of these have restricted diffusion, represent
ing acute or subacute infarctions in the right HONEY territory. There is enhancement of small blood

vessels associated with these infarctions.



The previously acute infarction in the right occipital lobe in the right PCA territory, has now becom
e a moderately large region of encephalomalacia and gliosis. There is mild hemosiderin staining

indicating there was mild previous hemorrhagic conversion. There is laminar necrosis associated with 
this (T1 shortening).



There is a small lacunar infarction involving the left corona radiata and adjacent portion of left ce
ntrum semiovale which occurred sometime after the previous MRI of 8/11/2019.



No acute hemorrhage, mass effect, midline shift, obstructive hydrocephalus, or extra-axial fluid roberto
ection.

No abnormal intra-axial enhancement.



IMPRESSION:

1) acute or subacute infarctions along long segment of right anterior cerebral artery territory.

2) moderately large old right occipital posterior cerebral artery territory infarction.

3) left cerebral deep white matter lacunar infarction which occurred sometime after the previous MRI 
of 8/11/2019.

4) no acute hemorrhage



Reported By: Enoch Buck 

Electronically Signed:  11/24/2020 6:07 PM

## 2020-11-24 NOTE — CT
CTA HEAD WITH AND WITHOUT CONTRAST

CTA NECK WITH CONTRAST:

11/24/20

 

Axial tomograms obtained through the head pre and post IV contrast with post contrast images through 
the head and neck following angio protocol with multiplanar reconstructions and 3D postprocessing. 

 

INDICATION:

Dizziness. History of prior CVA. 

 

CT HEAD WITHOUT CONTRAST:

Encephalomalacia in the right occipital lobe consistent with previous right posterior cerebral artery
 distribution infarct which was described on CT 8/10/19 and MRI of brain 8/11/19. 

 

On today's noncontrast CT there is a focal area of low attenuation in the left centrum semiovale cons
istent with a focal lacunar infarct in the deep white matter. Also focal low attenuation in the deep 
white matter adjacent the anterior horn on the right consistent with ischemic change. 

 

Low attenuation seen in the parasagittal region superiorly involving the superior frontal gyrus. This
 could represent subacute infarct in the right anterior cerebral artery distribution. Recommend MRI f
or confirmation. 

 

No hemorrhage. 

 

IMPRESSION: 

1. Low attenuation at the vertex of right frontal lobe parasagittal region concerning for infarct in 
the right anterior cerebral artery distribution possibly subacute. 

2. Chronic appearing ischemic changes in the white matter near the right frontal horn and left centru
m semiovale. 

3. Encephalomalacia in the right occipital lobe from prior right PCA infarct. 

 

CTA HEAD:

The intracranial internal carotid arteries are patent. Cavernous ICAs are patent. 

 

Middle cerebral arteries are patent and symmetric. 

 

There is evidence of occlusion of the right anterior cerebral artery A2 segment. 

 

Basilar artery is patent. Posterior cerebral arteries are patent and symmetric. There is an atretic P
1 segment on the left which is stable. There is a fetal communication on the left which is patent. 

 

IMPRESSION: 

Evidence of occlusion of the A2 segment of the right anterior cerebral artery. This is significant gi
lily the probable acute/subacute right AC infarct. 

 

CTA NECK:

Arch vessels unremarkable. Common carotid arteries unremarkable. Carotid bulbs and extracranial inter
nal carotid arteries are patent and symmetric. 

 

Vertebral arteries are patent and symmetric. Left vertebral terminates in PICA. 

 

No soft tissue abnormality. 

 

IMPRESSION: 

Unremarkable CTA neck. 

 

Findings relayed to Dr. Hooper.

 

Code CR 

 

POS: ISABELLA

## 2020-11-25 VITALS — SYSTOLIC BLOOD PRESSURE: 127 MMHG | TEMPERATURE: 97.9 F | DIASTOLIC BLOOD PRESSURE: 76 MMHG

## 2020-11-25 LAB
ALBUMIN SERPL BCG-MCNC: 4 G/DL (ref 3.5–5)
ALP SERPL-CCNC: 62 U/L (ref 40–110)
ALT SERPL W P-5'-P-CCNC: 22 U/L (ref 8–55)
ANION GAP SERPL CALC-SCNC: 13 MMOL/L (ref 10–20)
AST SERPL-CCNC: 22 U/L (ref 5–34)
BASOPHILS # BLD AUTO: 0 THOU/UL (ref 0–0.2)
BASOPHILS NFR BLD AUTO: 0.6 % (ref 0–1)
BILIRUB SERPL-MCNC: 0.5 MG/DL (ref 0.2–1.2)
BUN SERPL-MCNC: 13 MG/DL (ref 9.8–20.1)
CALCIUM SERPL-MCNC: 9.1 MG/DL (ref 7.8–10.44)
CHD RISK SERPL-RTO: 4.3 (ref ?–4.5)
CHLORIDE SERPL-SCNC: 102 MMOL/L (ref 98–107)
CHOLEST SERPL-MCNC: 159 MG/DL
CO2 SERPL-SCNC: 27 MMOL/L (ref 22–29)
CREAT CL PREDICTED SERPL C-G-VRATE: 136 ML/MIN (ref 70–130)
EOSINOPHIL # BLD AUTO: 0.2 THOU/UL (ref 0–0.7)
EOSINOPHIL NFR BLD AUTO: 3.2 % (ref 0–10)
GLOBULIN SER CALC-MCNC: 2.5 G/DL (ref 2.4–3.5)
GLUCOSE SERPL-MCNC: 177 MG/DL (ref 70–105)
HDLC SERPL-MCNC: 37 MG/DL
HGB BLD-MCNC: 14.5 G/DL (ref 12–16)
LDLC SERPL CALC-MCNC: 88 MG/DL
LYMPHOCYTES # BLD: 2.8 THOU/UL (ref 1.2–3.4)
LYMPHOCYTES NFR BLD AUTO: 37.6 % (ref 21–51)
MCH RBC QN AUTO: 31.7 PG (ref 27–31)
MCV RBC AUTO: 93.9 FL (ref 78–98)
MONOCYTES # BLD AUTO: 0.6 THOU/UL (ref 0.11–0.59)
MONOCYTES NFR BLD AUTO: 8 % (ref 0–10)
NEUTROPHILS # BLD AUTO: 3.7 THOU/UL (ref 1.4–6.5)
NEUTROPHILS NFR BLD AUTO: 50.6 % (ref 42–75)
PLATELET # BLD AUTO: 220 THOU/UL (ref 130–400)
POTASSIUM SERPL-SCNC: 3.8 MMOL/L (ref 3.5–5.1)
RBC # BLD AUTO: 4.58 MILL/UL (ref 4.2–5.4)
SODIUM SERPL-SCNC: 138 MMOL/L (ref 136–145)
TRIGL SERPL-MCNC: 170 MG/DL (ref ?–150)
WBC # BLD AUTO: 7.4 THOU/UL (ref 4.8–10.8)

## 2020-11-25 NOTE — HP
PRIMARY CARE PHYSICIAN:  Elio Barron.



CHIEF COMPLAINT:  Dizziness with some nausea and ataxia.



HISTORY OF PRESENT ILLNESS:  Ms. Alford is a 60-year-old female, who came to the

emergency room today for evaluation of dizziness and ataxia of bilateral lower left

leg weakness with some nausea.  She was admitted last August 2019 for similar

symptoms and at that time, she was diagnosed with an acute ischemic right posterior

cerebral artery stroke.  She had a CT and CTA of the neck and head on this visit,

which showed concern for infarct to the right anterior cerebral artery, chronic

appearing ischemic changes in the right frontal horn, old infarct from her prior

right PCA and an unremarkable CTA of the neck.  She also had a MRI of the brain

today while she was in the emergency room, which showed acute or subacute

infarctions along the long segment of the right anterior cerebral artery, moderately

large old right occipital posterior cerebral artery, left cerebral deep matter

lacunar infarction, which occurred sometime after the previous MRI of 08/11/2019.

No acute hemorrhage.  The patient reports that after her last stroke in last August

in 2019, she was worked up in [place], but is not really sure which hospital, so she

is going to get that information.  They worked her up for any cardiac causes of the

previous stroke and she said that they did not find anything definitive.  She does

have a history of diabetes type 2, hypothyroidism, anxiety, ADHD, possible

hypertension, and then the CVA in last August.  She is going to be admitted to the

stroke unit for further workup.  Dr. Crawley, a neurologist was consulted from the ER

and she was taking an aspirin 325 daily and so he had the ER add Plavix.  She had

been also taking a statin, which we will continue. 



REVIEW OF SYSTEMS:  She denies any fever or chills.  She denies any changing issues

with her vision.  She said that she had some residual deficits from her stroke last

August, but nothing new.  She denies any chest pain or shortness of breath.  She

does have some mild nausea.  Denied any vomiting, constipation, diarrhea.  She

reports that her bilateral lower legs were weak and she has fallen twice at home

today.  She reports that she has good diminished sensation on the left lower leg.

All systems are reviewed and negative unless mentioned above or in HPI. 



PAST MEDICAL HISTORY:  Please see HPI.



SURGICAL HISTORY:  Cholecystectomy, sinus surgery, tubal ligation.



PSYCHIATRIC HISTORY:  None.



SOCIAL HISTORY:  Denies any alcohol use.  No drug use.  No smoking history.  Lives

at home with her family. 



ALLERGIES:  NONE.



CURRENT MEDICATIONS:  

1. NovoLog 70/30, 15 units b.i.d.

2. Aspirin 81 mg p.o. once a day.

3. Citalopram 20 mg p.o. once a day.

4. Metformin 1000 mg p.o. b.i.d.

5. Atorvastatin 40 mg p.o. once a day.

6. Losartan 100 mg p.o. once a day.

7. Levothyroxine 125 mcg p.o. daily.

8. Topiramate 50 mg p.o. b.i.d. p.r.n.



PHYSICAL EXAMINATION:

VITAL SIGNS:  Blood pressure 160/97, pulse is 86, respiratory rate is 18,

temperature is 98.1, pO2 sats are 96% on room air. 

CONSTITUTIONAL:  She appears nontoxic.  She is alert and oriented to person, place

and time. 

HEENT:  Head atraumatic and normocephalic.  Eyes, eyelids are normal to inspection.

Pupils are equally round and reactive to light.  Extraocular muscles are intact.

ENT, mouth exam is normal.  Mucous membranes are moist. 

NECK:  Normal range of motion.  Trachea is midline. 

RESPIRATORY:  Chest movement is symmetrical.  Chest expansion is equal. 

CARDIOVASCULAR:  Heart sounds are normal.  Regular rate and rhythm. 

ABDOMEN:  Nontender.  Bowel sounds are heard. 

BACK:  Normal range of motion.  No CVA tenderness. 

UPPER EXTREMITIES:  Normal range of motion.  Motor strength is normal.  Sensation is

intact to lower extremity.  Inspection is normal.  Normal range of motion.  She does

have some left leg drift and decreased sensation. 

NEURO:  She is oriented to person, place, and time.  Speech is normal.  She does

have a left drift to the left leg with decreased sensation to the left leg. 

SKIN:  Warm, dry, and normal in color. 

PSYCH:  Oriented to person, place, and time.



DIAGNOSTIC STUDIES:  EKG in the emergency room shows beats per minute 78, normal

sinus rhythm, ST segments and T-waves are normal. 



PLAN AND ASSESSMENT:  

1. Cerebrovascular accident on the right anterior cerebral artery.  The patient has

had a CTA of the neck and head as well as an MRI of the brain while in the emergency

room.  We will do an echocardiogram.  We will ask PT, OT, speech, and Neurology to

be consulted.  We will decrease her aspirin to 81 mg.  We will add Plavix 75 mg p.o.

daily.  We will continue atorvastatin.  We will add fasting lipids, TSH, and A1c in

the a.m.  Neuro checks q.4.  The patient's symptoms had been going on for more than

24 hours and she was not a tPA candidate. 

2. History diabetes type 2, before meals and at bedtime Accu-Cheks.  We will restart

her home medications.  Add a sliding scale as needed for coverage. 

3. History of hypertension.  We will continue her home medications.

4. History of previous cerebrovascular accident.  Please see #1.

5. History of hypothyroidism.  We will check a TSH in the morning.  Restart home

medications. 

6. Deep venous thrombosis and gastrointestinal prophylaxis started.

7. Case discussed with Dr. Chapman, who agrees with plan.

8. Hospital course dependent on clinical findings.







Job ID:  [id number]

## 2020-11-25 NOTE — CON
NEUROLOGY CONSULTATION



DATE OF CONSULTATION:  11/25/2020



REASON FOR CONSULTATION:  Acute CVA.



HISTORY OF PRESENT ILLNESS:  Ms. Alford is a 60-year-old female with history

significant for CVA in August 2020, presented with dizziness, nausea, and 
ataxia.

Per the patient, she was admitted last August in 2019 with similar symptoms and 
at

that time she was diagnosed with acute ischemic right posterior cerebral artery

stroke.  She had a CT and the CT angio of the neck on this visit, which showed

concern for infarct in the right anterior cerebral artery territory with chronic

appearing ischemic changes in the right frontal horn and from old infarct and 
the

head CTA of the neck was unremarkable.  The MRI of the brain in the emergency 
room

showed acute or subacute infarction along the long segment of the right anterior

cerebral artery, moderately large, old, right occipital posterior cerebral 
artery

infarct.  Per  patient, she was worked up in [place] and she also has a

neurologist in [place], who follows her.  The patient has been compliant with

aspirin and statin since August 2019.   Since the patient, who has been

taking aspirin on a regular basis, Plavix was added.  The patient denies any 
focal

weakness, focal paresthesias, headache, chest pain, abdominal pain, recent 
illness,

chest pain, shortness of breath, constipation, double vision associated with 
this

episode. 



 REVIEW OF SYSTEMS:  All systems reviewed, which were negative except the 
pertinent

positives and negatives mentioned in the HPI. 





PAST MEDICAL HISTORY: diabetes,

hypothyroidism, anxiety, ADHD, and hypertension.





ALLERGIES:  NO KNOWN DRUG ALLERGIES.



SOCIAL HISTORY:  The patient lives at home with her family.  Denies smoking,

alcohol, or illegal drug use. 



CURRENT MEDICATIONS:  

1. NovoLog 70/30, 15 units b.i.d.

2. Aspirin 81 mg once daily.

3. Citalopram 20 mg once a day.

4. Metformin 1000 mg p.o. b.i.d.

5. Atorvastatin 40 mg p.o. once a day.

6. Losartan 100 mg p.o. once a day.

7. Levothyroxine 125 mcg p.o. daily.

8. Topiramate 50 mg p.o. b.i.d. p.r.n.



PHYSICAL EXAMINATION:

VITAL SIGNS:  Blood pressure 160/90, pulse 86, and respiratory rate 18. 

CONSTITUTIONAL:  Alert and awake female, in no acute distress. 

CVS:  Regular rate and rhythm. 

CHEST:  Clear. 

ABDOMEN:  Soft. 

NECK:  Supple. 

NEUROLOGIC:  Mental status, the patient is alert and oriented to person, place, 
and

time.  Speech is clear.  Cranial nerves 3 through 12 intact, 2 left homonymous

hemianopia.  Motor, muscle tone and bulk are normal.  Strength 5/5 bilaterally

except left leg 4+/5.  Cerebellar, left leg drift is seen.  Finger-nose testing

intact.  Sensory, withdraws all 4 extremities to nail  bed pressure.  Gait 
deferred

due to the patient's safety reasons. 



DATA REVIEWED:  I reviewed the MRI of the brain, which showed right anterior

cerebral artery infarct consistent with her symptoms. 



ASSESSMENT AND PLAN:  Ms. Diana Alford is a 60-year-old female 
presented

with nausea, vomiting, and dizziness.  MRI of the brain reviewed, which was

consistent with acute infarction in the right anterior cerebral artery 
territory.

CTA of the head and neck completed.  CTA of the neck was nevertheless 
essentially

unremarkable.  CTA of the [place] showed evidence of occlusion of the 
A2

segment of the right internal anterior cerebral artery.  This is significant 
given

the probable acute/subacute right HONEY infarct.  Consider Neurosurgery input.  
Neuro

checks every 4 hours.  Continue aspirin 81 mg daily and Plavix 75 mg daily for

secondary stroke prevention.  Continue high-intensity statin for secondary 
stroke

prevention.  Strict control of blood glucose.  Permissive control of blood 
pressure

at this time.  Continue home medications.  PT/OT/Speech.  Telemetry to rule out

arrhythmias.  2D echo to evaluate for left ventricular ejection fraction.  
Continue

medical management  per primary team.  Plan discussed in detail with the 
patient,  at bedside.

and also with the nursing staff.  We will continue to follow. 



Thank you for the consult.







Job ID:  [id number]



MTDD

## 2020-11-25 NOTE — CT
CTA HEAD WITH AND WITHOUT CONTRAST

CTA NECK WITH CONTRAST:

11/24/20

 

Axial tomograms obtained through the head pre and post IV contrast with post contrast images through 
the head and neck following angio protocol with multiplanar reconstructions and 3D postprocessing. 

 

INDICATION:

Dizziness. History of prior CVA. 

 

CT HEAD WITHOUT CONTRAST:

Encephalomalacia in the right occipital lobe consistent with previous right posterior cerebral artery
 distribution infarct which was described on CT 8/10/19 and MRI of brain 8/11/19. 

 

On today's noncontrast CT there is a focal area of low attenuation in the left centrum semiovale cons
istent with a focal lacunar infarct in the deep white matter. Also focal low attenuation in the deep 
white matter adjacent the anterior horn on the right consistent with ischemic change. 

 

Low attenuation seen in the parasagittal region superiorly involving the superior frontal gyrus. This
 could represent subacute infarct in the right anterior cerebral artery distribution. Recommend MRI f
or confirmation. 

 

No hemorrhage. 

 

IMPRESSION: 

1. Low attenuation in the vertex right frontal lobe parasagittal region concerning for infarct in the
 right anterior cerebral artery distribution possibly subacute. 

2. Chronic appearing ischemic changes in the white matter near the right frontal horn and left centru
m semiovale. 

3. Encephalomalacia in the right occipital lobe from prior right PCA infarct. 

 

CTA HEAD:

The intracranial internal carotid arteries are patent. Cavernous ICAs are patent. 

 

Middle cerebral arteries are patent and symmetric. 

 

There is evidence of occlusion of the right anterior cerebral artery A2 segment. 

 

Basilar artery is patent. Posterior cerebral arteries are patent and symmetric. There is an atretic P
1 segment on the left which is stable. There is a fetal communication on the left which is patent. 

 

IMPRESSION: 

Evidence of occlusion of the A2 segment of the right anterior cerebral artery. This is significant gi
lily the probable acute/subacute right AC infarct. 

 

CTA NECK:

Arch vessels unremarkable. Common carotid arteries unremarkable. Carotid bulbs and extracranial inter
nal carotid arteries are patent and symmetric. 

 

Vertebral arteries are patent and symmetric. Left vertebral terminates in PICA. 

 

No soft tissue abnormality. 

 

IMPRESSION: 

Unremarkable CTA neck. 

 

Findings relayed to Dr. Hooper.

 

Code CR

## 2020-12-10 ENCOUNTER — HOSPITAL ENCOUNTER (OUTPATIENT)
Dept: HOSPITAL 92 - ERS | Age: 60
Setting detail: OBSERVATION
LOS: 1 days | Discharge: HOME | End: 2020-12-11
Attending: HOSPITALIST | Admitting: HOSPITALIST
Payer: SELF-PAY

## 2020-12-10 VITALS — BODY MASS INDEX: 31.7 KG/M2

## 2020-12-10 DIAGNOSIS — E03.9: ICD-10-CM

## 2020-12-10 DIAGNOSIS — Z79.84: ICD-10-CM

## 2020-12-10 DIAGNOSIS — Z20.828: ICD-10-CM

## 2020-12-10 DIAGNOSIS — Z79.899: ICD-10-CM
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
## 2020-12-10 DIAGNOSIS — E78.5: ICD-10-CM

## 2020-12-10 DIAGNOSIS — E11.65: ICD-10-CM

## 2020-12-10 DIAGNOSIS — I63.9: Primary | ICD-10-CM

## 2020-12-10 DIAGNOSIS — I10: ICD-10-CM

## 2020-12-10 LAB
ALBUMIN SERPL BCG-MCNC: 4.2 G/DL (ref 3.5–5)
ALP SERPL-CCNC: 89 U/L (ref 40–110)
ALT SERPL W P-5'-P-CCNC: 21 U/L (ref 8–55)
ANION GAP SERPL CALC-SCNC: 14 MMOL/L (ref 10–20)
APTT PPP: 27.9 SEC (ref 22.9–36.1)
AST SERPL-CCNC: 16 U/L (ref 5–34)
BACTERIA UR QL AUTO: (no result) HPF
BASOPHILS # BLD AUTO: 0.1 THOU/UL (ref 0–0.2)
BASOPHILS NFR BLD AUTO: 0.9 % (ref 0–1)
BILIRUB SERPL-MCNC: 0.3 MG/DL (ref 0.2–1.2)
BUN SERPL-MCNC: 13 MG/DL (ref 9.8–20.1)
CALCIUM SERPL-MCNC: 9.1 MG/DL (ref 7.8–10.44)
CHLORIDE SERPL-SCNC: 100 MMOL/L (ref 98–107)
CK SERPL-CCNC: 52 U/L (ref 29–168)
CO2 SERPL-SCNC: 26 MMOL/L (ref 22–29)
CREAT CL PREDICTED SERPL C-G-VRATE: 0 ML/MIN (ref 70–130)
EOSINOPHIL # BLD AUTO: 0.2 THOU/UL (ref 0–0.7)
EOSINOPHIL NFR BLD AUTO: 3.2 % (ref 0–10)
GLOBULIN SER CALC-MCNC: 2.6 G/DL (ref 2.4–3.5)
GLUCOSE SERPL-MCNC: 318 MG/DL (ref 70–105)
HGB BLD-MCNC: 15.1 G/DL (ref 12–16)
INR PPP: 0.9
LEUKOCYTE ESTERASE UR QL STRIP.AUTO: 25 LEU/UL
LYMPHOCYTES # BLD: 2.2 THOU/UL (ref 1.2–3.4)
LYMPHOCYTES NFR BLD AUTO: 35.3 % (ref 21–51)
MCH RBC QN AUTO: 32.5 PG (ref 27–31)
MCV RBC AUTO: 94.5 FL (ref 78–98)
MONOCYTES # BLD AUTO: 0.4 THOU/UL (ref 0.11–0.59)
MONOCYTES NFR BLD AUTO: 6 % (ref 0–10)
NEUTROPHILS # BLD AUTO: 3.4 THOU/UL (ref 1.4–6.5)
NEUTROPHILS NFR BLD AUTO: 54.7 % (ref 42–75)
PLATELET # BLD AUTO: 233 THOU/UL (ref 130–400)
POTASSIUM SERPL-SCNC: 4.3 MMOL/L (ref 3.5–5.1)
PROTHROMBIN TIME: 12.4 SEC (ref 12–14.7)
RBC # BLD AUTO: 4.63 MILL/UL (ref 4.2–5.4)
RBC UR QL AUTO: (no result) HPF (ref 0–3)
SODIUM SERPL-SCNC: 136 MMOL/L (ref 136–145)
SP GR UR STRIP: 1.02 (ref 1–1.04)
TROPONIN I SERPL DL<=0.01 NG/ML-MCNC: (no result) NG/ML (ref ?–0.03)
TROPONIN I SERPL DL<=0.01 NG/ML-MCNC: 0.01 NG/ML (ref ?–0.03)
WBC # BLD AUTO: 6.2 THOU/UL (ref 4.8–10.8)
WBC UR QL AUTO: (no result) HPF (ref 0–3)

## 2020-12-10 PROCEDURE — 90471 IMMUNIZATION ADMIN: CPT

## 2020-12-10 PROCEDURE — 81003 URINALYSIS AUTO W/O SCOPE: CPT

## 2020-12-10 PROCEDURE — 90662 IIV NO PRSV INCREASED AG IM: CPT

## 2020-12-10 PROCEDURE — G0378 HOSPITAL OBSERVATION PER HR: HCPCS

## 2020-12-10 PROCEDURE — 81015 MICROSCOPIC EXAM OF URINE: CPT

## 2020-12-10 PROCEDURE — 71045 X-RAY EXAM CHEST 1 VIEW: CPT

## 2020-12-10 PROCEDURE — 36415 COLL VENOUS BLD VENIPUNCTURE: CPT

## 2020-12-10 PROCEDURE — 84443 ASSAY THYROID STIM HORMONE: CPT

## 2020-12-10 PROCEDURE — 85025 COMPLETE CBC W/AUTO DIFF WBC: CPT

## 2020-12-10 PROCEDURE — 70450 CT HEAD/BRAIN W/O DYE: CPT

## 2020-12-10 PROCEDURE — 82550 ASSAY OF CK (CPK): CPT

## 2020-12-10 PROCEDURE — 80061 LIPID PANEL: CPT

## 2020-12-10 PROCEDURE — 93005 ELECTROCARDIOGRAM TRACING: CPT

## 2020-12-10 PROCEDURE — G0008 ADMIN INFLUENZA VIRUS VAC: HCPCS

## 2020-12-10 PROCEDURE — U0003 INFECTIOUS AGENT DETECTION BY NUCLEIC ACID (DNA OR RNA); SEVERE ACUTE RESPIRATORY SYNDROME CORONAVIRUS 2 (SARS-COV-2) (CORONAVIRUS DISEASE [COVID-19]), AMPLIFIED PROBE TECHNIQUE, MAKING USE OF HIGH THROUGHPUT TECHNOLOGIES AS DESCRIBED BY CMS-2020-01-R: HCPCS

## 2020-12-10 PROCEDURE — 85730 THROMBOPLASTIN TIME PARTIAL: CPT

## 2020-12-10 PROCEDURE — 87086 URINE CULTURE/COLONY COUNT: CPT

## 2020-12-10 PROCEDURE — 83036 HEMOGLOBIN GLYCOSYLATED A1C: CPT

## 2020-12-10 PROCEDURE — 83605 ASSAY OF LACTIC ACID: CPT

## 2020-12-10 PROCEDURE — 36416 COLLJ CAPILLARY BLOOD SPEC: CPT

## 2020-12-10 PROCEDURE — 70551 MRI BRAIN STEM W/O DYE: CPT

## 2020-12-10 PROCEDURE — 96372 THER/PROPH/DIAG INJ SC/IM: CPT

## 2020-12-10 PROCEDURE — 87635 SARS-COV-2 COVID-19 AMP PRB: CPT

## 2020-12-10 PROCEDURE — 80053 COMPREHEN METABOLIC PANEL: CPT

## 2020-12-10 PROCEDURE — 84484 ASSAY OF TROPONIN QUANT: CPT

## 2020-12-10 PROCEDURE — 85610 PROTHROMBIN TIME: CPT

## 2020-12-10 RX ADMIN — INSULIN LISPRO PRN UNIT: 100 INJECTION, SOLUTION INTRAVENOUS; SUBCUTANEOUS at 23:46

## 2020-12-10 NOTE — PDOC.HHP
Hospitalist HPI





- History of Present Illness


Worsening left-sided weakness


History of Present Illness: 





Patient is a 60-year-old female who initially presented to the hospital with 

dizziness and ataxia and left lower limb weakness.  She underwent a stroke work-

up and was discharged November 30.  MRI brain indicated acute right posterior 

cerebellar infarct.  Patient did not get any physical therapies due to 

Thanksgiving holidays.





Patient states that for the past couple days she has been noticing worsening 

weakness and had a fall this morning trying to get up however she feels that she

slipped under a rug and then could not get herself up.  Denies any fevers or 

chills any nausea vomiting diarrhea.  She has been eating well and taking all 

her medications.





Hospitalist ROS





- Review of Systems


Cardiovascular: denies: chest pain, palpitations, orthopnea, paroxysmal noc. 

dyspnea, edema, light headedness, other


Gastrointestinal: denies: nausea, vomiting, abdominal pain, diarrhea, 

constipation, melena, hematochezia, other


Neurological: reports: weakness





Hospitalist History





- Past Medical History


Source: patient, family, father, mother


Cardiac: reports: HTN


CNS: reports: CVA


Endocrine: reports: Diabetes, Hyperthyroidism





- Past Surgical History


Past Surgical History: reports: Cholecystectomy, Tubal Ligation, Other


Other Surgical History: 





Sinus surgery





- Family History


Family History: reports: no pertinent history





- Social History


Smoking Status: Never smoker


Alcohol: reports: None


Drugs: reports: none


Living Situation: With Family


Activity level: uses cane/walker





- Exam


Neck: negative: supple, symmetric, no JVD, no thyromegaly, no lymphadenopathy, 

no carotid bruit, JVD


Heart: negative: RRR, no murmur, no gallops, no rubs, normal peripheral pulses, 

irregular, diminshed peripheral pulses, murmur present, II/IV, III/IV


Respiratory: negative: CTAB, no wheezes, no rales, no ronchi, normal chest 

expansion, no tachypnea, normal percussion, rales, rhonchi, tachypneic, wheezes


Gastrointestinal: negative: soft, non-tender, non-distended, normal bowel 

sounds, no palpable masses, no hepatomegaly, no splenomegaly, no bruit, no 

guarding, no rigidity, tender to palpation, distended, diminished bowl sounds, 

voluntary guarding


Neurological - other findings: Left-sided weakness rather than right side





Hospitalist Results





- Labs


Result Diagrams: 


                                 12/10/20 13:31





                                 12/10/20 13:32


Lab results: 


                                        











WBC  6.2 thou/uL (4.8-10.8)   12/10/20  13:31    


 


Hgb  15.1 g/dL (12.0-16.0)   12/10/20  13:31    


 


Hct  43.7 % (36.0-47.0)   12/10/20  13:31    


 


MCV  94.5 fL (78.0-98.0)   12/10/20  13:31    


 


Plt Count  233 thou/uL (130-400)   12/10/20  13:31    


 


Neutrophils %  54.7 % (42.0-75.0)   12/10/20  13:31    


 


Sodium  136 mmol/L (136-145)   12/10/20  13:32    


 


Potassium  4.3 mmol/L (3.5-5.1)   12/10/20  13:32    


 


Chloride  100 mmol/L ()   12/10/20  13:32    


 


Carbon Dioxide  26 mmol/L (22-29)   12/10/20  13:32    


 


BUN  13 mg/dL (9.8-20.1)   12/10/20  13:32    


 


Creatinine  0.82 mg/dL (0.6-1.1)   12/10/20  13:32    


 


Glucose  318 mg/dL ()  H  12/10/20  13:32    


 


Lactic Acid  1.9 mmol/L (0.5-2.2)   12/10/20  13:32    


 


Calcium  9.1 mg/dL (7.8-10.44)   12/10/20  13:32    


 


Total Bilirubin  0.3 mg/dL (0.2-1.2)   12/10/20  13:32    


 


AST  16 U/L (5-34)   12/10/20  13:32    


 


ALT  21 U/L (8-55)   12/10/20  13:32    


 


Alkaline Phosphatase  89 U/L ()   12/10/20  13:32    


 


Creatine Kinase  52 U/L ()   12/10/20  13:32    


 


Troponin I  0.013 ng/mL (< 0.028)   12/10/20  16:38    


 


Serum Total Protein  6.8 g/dL (6.0-8.3)   12/10/20  13:32    


 


Albumin  4.2 g/dL (3.5-5.0)   12/10/20  13:32    


 


Urine Ketones  Negative mg/dL (Negative)   12/10/20  14:43    


 


Urine Blood  Negative  (Negative)   12/10/20  14:43    


 


Urine Nitrite  Negative  (Negative)   12/10/20  14:43    


 


Ur Leukocyte Esterase  25 Linda/uL (Negative)  A  12/10/20  14:43    


 


Urine RBC  0-3 HPF (0-3)   12/10/20  14:43    


 


Urine WBC  0-3 HPF (0-3)   12/10/20  14:43    


 


Ur Squamous Epith Cells  4-6 HPF (0-3)  A  12/10/20  14:43    


 


Urine Bacteria  1+ HPF (None Seen)  A  12/10/20  14:43    














- EKG Interpretation


EKG: 





No acute ST elevation or depression noted.





Hospitalist H&P A/P





- Problem


(1) CVA (cerebral vascular accident)


Code(s): I63.9 - CEREBRAL INFARCTION, UNSPECIFIED   Status: Acute   





(2) Dyslipidemia


Code(s): E78.5 - HYPERLIPIDEMIA, UNSPECIFIED   Status: Acute   





(3) HTN (hypertension)


Code(s): I10 - ESSENTIAL (PRIMARY) HYPERTENSION   Status: Acute   





(4) Uncontrolled diabetes mellitus


Code(s): E11.65 - TYPE 2 DIABETES MELLITUS WITH HYPERGLYCEMIA   Status: Acute   





- Plan


Plan: 





Patient states she has been compliant with her medications at home.  Urine 

analysis chest x-ray no acute infectious etiology noted.  We will get an MRI 

brain.  She recently had a CTA of the head and neck and also had an echo will 

not repeat that.  We will get neurology to assess her.  We will continue the 

aspirin Plavix and statin.  CTA did indicate occlusion to the A2 segment of the 

right anterior cerebral artery however the common carotids were stable.





Hypothyroidism: We will start patient on her home medications will check a TSH.





Diabetes: We will check a hemoglobin A1c she states that she did not take her 

insulin this morning.





DVT prophylaxis will be patient SCDs and Lovenox

## 2020-12-10 NOTE — RAD
SINGLE VIEW CHEST:

 

Date:  12/10/2020

 

COMPARISON:  

11/24/2020. 

 

HISTORY:  

Fall with weakness. 

 

FINDINGS:

Single view of the chest shows a normal sized cardiomediastinal silhouette. There is no evidence of c
onsolidation, mass, or pleural effusion. The bones are unremarkable. 

 

IMPRESSION: 

No evidence of acute cardiopulmonary disease.  

 

POS: EAA

## 2020-12-10 NOTE — CT
CT BRAIN WITHOUT CONTRAST:

12/10/20

 

HISTORY: 

Fall, weakness. 

 

There is an old right PCA infarction. There is a small focal infarction in the left periventricular w
mirta matter. The ventricular size is appropriate and the basilar cisterns patent. There is a small ol
d infarct in the right HONEY territory. 

 

No evidence of acute infarct, hemorrhage, midline shift, or abnormal extra-axial fluid collections ar
e seen. The bony calvarium is intact. The visualized paranasal sinuses and mastoid air cells are well
 aerated.

 

IMPRESSION: 

No CT evidence of acute intracranial process. 

 

POS: AH

## 2020-12-10 NOTE — MRI
BRAIN MRI WITHOUT CONTRAST:

12/10/20

 

COMPARISON: 

11/24/20.

 

HISTORY: 

Evaluate for acute infarction, fall, dizziness. 

 

TECHNIQUE:  

Multiplanar and multisequence MR imaging of the brain is provided without contrast. 

 

FINDINGS: 

The diffusion weighted imaging demonstrates subtle increased signal intensity on the diffusion weight
ed imaging within the posteromedial aspect of the right frontal region, less conspicuous than on the 
11/24/20 exam suggesting subacute right anterior cerebral artery territory infarction. There is an ar
ea of T2 shine through within the deep white matter of the left frontal region, stable as well. 

 

There is encephalomalacia within the posteromedial aspect of the right occipital lobe, evidence of pr
ior infarction. The diffusion weighted imaging demonstrates no evidence for acute infarction. There i
s no restricted diffusion within the brainstem or the posterior fossa. 

 

There is increased T2 and FLAIR signal within the subacute infarction within the right anterior cereb
ral artery territory, less conspicuous than on the 11/24/20 exam. Stable T2 and FLAIR hyperintensity 
is seen in the area of encephalomalacia within the right occipital lobe consistent with a remote righ
t PCA infarction. Foci of increased T2 and FLAIR signal within the periventricular deep and subcortic
al white matter suggests small vessel disease. Arterial flow voids at the axial level of the skull ba
se appear grossly unremarkable on the T2 weighted imaging. 

 

Regional bone marrow signal intensity appears within normal limits. 

 

Minimal blooming artifact is noted within the chronic right PCA infarction suggesting remote blood pr
oducts. 

 

IMPRESSION: 

1.      Subacute infarction of the right anterior cerebral artery territory, less conspicuous when co
mpared to the 11/24/20 exam. 

2.      Old right occipital infarction within the right PCA territory. 

3.      No evidence for acute infarction or acute intracranial hemorrhage. 

 

POS: [facility]

## 2020-12-11 VITALS — TEMPERATURE: 97.9 F

## 2020-12-11 VITALS — SYSTOLIC BLOOD PRESSURE: 129 MMHG | DIASTOLIC BLOOD PRESSURE: 83 MMHG

## 2020-12-11 LAB
CHD RISK SERPL-RTO: 3.6 (ref ?–4.5)
CHOLEST SERPL-MCNC: 142 MG/DL
HDLC SERPL-MCNC: 39 MG/DL
LDLC SERPL CALC-MCNC: 60 MG/DL
TRIGL SERPL-MCNC: 211 MG/DL (ref ?–150)

## 2020-12-11 RX ADMIN — INSULIN LISPRO PRN UNIT: 100 INJECTION, SOLUTION INTRAVENOUS; SUBCUTANEOUS at 06:22

## 2020-12-11 NOTE — CON
NEUROLOGY CONSULTATION



DATE OF CONSULTATION:  12/11/2020



REASON FOR CONSULTATION:  Worsening left-sided weakness.



HISTORY OF PRESENT ILLNESS:  Ms. Alford is a 60-year-old female with history

significant for prior CVA, hypertension, diabetes, hypothyroidism, presented to 
the

emergency room with ataxia and dizziness and worsening of the left lower limb

weakness, so the patient underwent stroke workup and was discharged on November 30.

MRI of the brain showed subacute right posterior cerebellar infarct.  She was

getting physical therapy, but did not get any therapy due to Thanksgiving 
holidays.

Per the patient, she has noticed for the past few days worsening weakness and a

fall, so she decided to come to the emergency room for further evaluation.  The

episode of worsening left-sided weakness occurred for few hours and resolved on 
its

own.  The patient has been compliant with her medication.  The patient denies

nausea, vomiting, headache, chest pain, abdominal pain, recent illness or recent

exposure to COVID. 



REVIEW OF SYSTEMS:  All systems reviewed and were negative except the pertinent

positives and negatives mentioned in the HPI. 



PAST MEDICAL HISTORY:  Hypertension, CVA, diabetes, hypothyroidism.



PAST SURGICAL HISTORY:  Cholecystectomy, tubal ligation, sinus surgery.



SOCIAL HISTORY:  The patient lives with family.  Denies smoking, alcohol, or 
illegal

drug use.  She uses cane or walker. 



Allergies: No known drug allergies





PHYSICAL EXAMINATION:

General: Alert and awake in no acute distress

Neck: negative: supple, symmetric, no JVD, no thyromegaly, no lymphadenopathy, 
no carotid bruit, JVD

Heart: negative: RRR, no murmur, no gallops, no rubs, normal peripheral pulses, 
irregular, diminshed peripheral pulses, murmur present, II/IV, III/IV

Respiratory: negative: CTAB, no wheezes, no rales, no ronchi, normal chest 
expansion, no tachypnea, normal percussion, rales, rhonchi, tachypneic, wheezes

Gastrointestinal: negative: soft, non-tender, non-distended, normal bowel 
sounds, no palpable masses, no hepatomegaly, no splenomegaly, no bruit, no 
guarding, no rigidity, tender to palpation, distended, diminished bowl sounds, 
voluntary guarding

Neurological -   Mental status, the patient is alert and oriented to person, 
place, and

time.  Speech is clear.  Recent and remote memory, intact.  Fund of knowledge,

appropriate.  Cranial nerves 2 through 12 intact.  Cerebellar, finger-nose 
testing

intact.  Motor, muscle tone and bulk are normal.  Left cerebellar hemiparesis.

Sensory, intact.  Gait deferred due to the patient's safety reasons. 



DATA REVIEWED:  Labs were reviewed, which were significant for hyperglycemia 
318.

 

 

Lab results: 

                                        







WBC  6.2 thou/uL (4.8-10.8)   12/10/20  13:31    

 

Hgb  15.1 g/dL (12.0-16.0)   12/10/20  13:31    

 

Hct  43.7 % (36.0-47.0)   12/10/20  13:31    

 

MCV  94.5 fL (78.0-98.0)   12/10/20  13:31    

 

Plt Count  233 thou/uL (130-400)   12/10/20  13:31    

 

Neutrophils %  54.7 % (42.0-75.0)   12/10/20  13:31    

 

Sodium  136 mmol/L (136-145)   12/10/20  13:32    

 

Potassium  4.3 mmol/L (3.5-5.1)   12/10/20  13:32    

 

Chloride  100 mmol/L ()   12/10/20  13:32    

 

Carbon Dioxide  26 mmol/L (22-29)   12/10/20  13:32    

 

BUN  13 mg/dL (9.8-20.1)   12/10/20  13:32    

 

Creatinine  0.82 mg/dL (0.6-1.1)   12/10/20  13:32    

 

Glucose  318 mg/dL ()  H  12/10/20  13:32    

 

Lactic Acid  1.9 mmol/L (0.5-2.2)   12/10/20  13:32    

 

Calcium  9.1 mg/dL (7.8-10.44)   12/10/20  13:32    

 

Total Bilirubin  0.3 mg/dL (0.2-1.2)   12/10/20  13:32    

 

AST  16 U/L (5-34)   12/10/20  13:32    

 

ALT  21 U/L (8-55)   12/10/20  13:32    

 

Alkaline Phosphatase  89 U/L ()   12/10/20  13:32    

 

Creatine Kinase  52 U/L ()   12/10/20  13:32    

 

Troponin I  0.013 ng/mL (< 0.028)   12/10/20  16:38    

 

Serum Total Protein  6.8 g/dL (6.0-8.3)   12/10/20  13:32    

 

Albumin  4.2 g/dL (3.5-5.0)   12/10/20  13:32    

 

Urine Ketones  Negative mg/dL (Negative)   12/10/20  14:43    

 

Urine Blood  Negative  (Negative)   12/10/20  14:43    

 

Urine Nitrite  Negative  (Negative)   12/10/20  14:43    

 

Ur Leukocyte Esterase  25 Linda/uL (Negative)  A  12/10/20  14:43    

 

Urine RBC  0-3 HPF (0-3)   12/10/20  14:43    

 

Urine WBC  0-3 HPF (0-3)   12/10/20  14:43    

 

Ur Squamous Epith Cells  4-6 HPF (0-3)  A  12/10/20  14:43    

 

Urine Bacteria  1+ HPF (None Seen)  A  12/10/20  14:43    









- EKG Interpretation

EKG: 



No acute ST elevation or depression noted.



 



ASSESSMENT AND PLAN:  



 

(1) CVA (cerebral vascular accident)

Code(s): I63.9 - CEREBRAL INFARCTION, UNSPECIFIED   Status: Acute   



(2) Dyslipidemia

Code(s): E78.5 - HYPERLIPIDEMIA, UNSPECIFIED   Status: Acute   



(3) HTN (hypertension)

Code(s): I10 - ESSENTIAL (PRIMARY) HYPERTENSION   Status: Acute   



(4) Uncontrolled diabetes mellitus

Code(s): E11.65 - TYPE 2 DIABETES MELLITUS WITH HYPERGLYCEMIA   Status: Acute  





Ms. Diana Alford is a 60-year-old female who presented with

worsening left-sided weakness.  The patient attributes to sleep deprivation, and

there is a concern about obstructive sleep apnea.  She is already on aspirin,

Plavix, and statin.  The patient was given an option to switch to Aggrenox, but 
she

declined at this time.  CT of the head and neck did not reveal hemodynamically

significant stenosis.  MRI of the brain did not reveal acute intracranial 
pathology.

 Continue strict control of blood pressure and blood glucose.  PT/OT/Speech.  
Patient 

recently had the echocardiogram and CTA of the head and neck, so

we will not repeat those testing.  Continue medical management per primary team.
 We

will continue to follow. 



Thank you for the consult.







Job ID:  [id number]



[facility]

## 2020-12-11 NOTE — [email or web address]
Provider





- Provider


Date of Admission: 


12/10/20 15:48





Date of Discharge: 12/11/20


Admitting Provider: 


Mana Walls MD





Consultations: Neurology


Primary Care Physician: 


OUT OF TOWN








Course





- Hospital Course


Hospital Course: 


Patient is a very pleasant 60-year-old female who recently had a stroke who 

presents to the hospital for worsening left-sided weakness.  Patient had a acute

or subacute infarct on the right anterior cerebral artery and moderately large 

old right occipital posterior cerebral artery infarct.  Patient at that time did

not want therapy and went home.  She denies any nausea vomiting diarrhea fevers 

or chills.  Patient stated that she has been feeling tired and got weak on the 

left side and came into the hospital for further evaluation.  She had a repeat 

MRI which did not indicate an acute stroke.  She was seen by neurology.  Patient

was offered Aggrenox however patient refused and stated that she will continue 

her aspirin and Plavix.  I have decreased her dose of her blood pressure 

medication from losartan 100 mg daily to 25 mg given her low blood pressures to 

begin with.





I also recommended the patient to get a sleep apnea study since  states 

that she has been snoring quite a bit and on examination her Mallampati score is

a 4.





Patient states that she will follow-up outpatient for a sleep apnea study.





- Labs


Lab Results: 


                                        





                                 12/10/20 13:31 





                                 12/10/20 13:32 





Abnormal Lab Results - Last 48 hrs





12/10/20 13:31: MCH 32.5 H, RDW 11.4 L


12/10/20 14:43: Urine Glucose (UA) Greater than 1000 A, Ur Leukocyte Esterase 25

A, Ur Squamous Epith Cells 4-6 A, Urine Bacteria 1+ A


12/11/20 04:40: Triglycerides 211 H


12/11/20 04:40: Hemoglobin A1c 9.9 H











- Physical Exam


Vitals: 


                             Vital Signs (12 hours)











  Temp Pulse Pulse Pulse Resp BP BP


 


 12/11/20 10:40   86    16  


 


 12/11/20 09:15    78    129/83 


 


 12/11/20 09:05    78  84   129/83  107/73


 


 12/11/20 07:54  97.9 F  75    14  


 


 12/11/20 04:00  97.3 F L  90    16  














  BP Pulse Ox


 


 12/11/20 10:40  117/65 


 


 12/11/20 09:15  


 


 12/11/20 09:05  


 


 12/11/20 07:54  120/72  97


 


 12/11/20 04:00  105/71  97








                                     Weight











Weight                         221 lb














Physical Exam: 


The patient was seen and examined on the day of discharge.








Problem





- Problem


(1) CVA (cerebral vascular accident)


Code(s): I63.9 - CEREBRAL INFARCTION, UNSPECIFIED   Status: Acute   





(2) Dyslipidemia


Code(s): E78.5 - HYPERLIPIDEMIA, UNSPECIFIED   Status: Acute   





(3) HTN (hypertension)


Code(s): I10 - ESSENTIAL (PRIMARY) HYPERTENSION   Status: Acute   





(4) Uncontrolled diabetes mellitus


Code(s): E11.65 - TYPE 2 DIABETES MELLITUS WITH HYPERGLYCEMIA   Status: Acute   





Plan





- Discharge Medications


Prescriptions: 


Losartan [Cozaar] 25 mg PO DAILY #30 tab


Home Medications: 


                                        











 Medication  Instructions  Recorded  Confirmed  Type


 


Citalopram Hydrobromide 20 mg PO DAILY 08/11/19 12/10/20 History





[Citalopram HBr]    


 


metFORMIN HCl [Metformin HCl ER] 1,000 mg PO BID 08/11/19 12/10/20 History


 


Atorvastatin Calcium [Lipitor] 40 mg PO HS #90 tab 08/13/19 12/10/20 Rx


 


Levothyroxine Sodium [Synthroid] 125 mcg PO DAILY 11/24/20 12/10/20 History


 


Topiramate 50 mg PO BID PRN 11/24/20 12/10/20 History


 


Aspirin [Ecotrin Low Strength] 81 mg PO DAILY #30 tab 11/25/20 12/10/20 Rx


 


Clopidogrel Bisulfate [Plavix] 75 mg PO DAILY 30 Days #30 tab 11/25/20 12/10/20 

Rx


 


Losartan [Cozaar] 25 mg PO DAILY #30 tab 12/11/20  Rx











Allergies: 








No Known Drug Allergies Allergy (Verified 11/24/20 22:02)


   








- Discharge Instructions


Discharge Instructions:: 


i have decreased your blood pressure medication losartan from 100mg to 25mg 

daily. Please check your blood pressure at home. follow up with your primary 

care doctor. please take your diabetes medication your sugars have been high. 

you will need to follow up with your primary care doctor about that too. check 

your blood sugars and write them down.  


Activity:: Activity as Tolerated


Nourishment:: Heart Healthy Diet





- Follow up Plan


Referrals: 


Vane Salazar MD [Active] - 


Reji Watson Jr, MD [MD Not on Staff] - 7 Days (call and schedule 

follow up appt to see Dr Watson within 7 days. Patient wants to schedule her own 

appointment. )


Disposition: HOME





Quality





- Care Measures


CORE MEASURES:: Stroke/TIA





- Stroke/TIA


Did you prescribe antithrombotic therapy?: No


Specify reason for no DC antithrombotic therapy: Treatment not indicated


Did you prescribe anticoagulant for A Fib/Flutter?: No


Specify reason for no DC anticoagulant: Treatment not indicated


Did you prescribe a statin medication?: Yes

## 2020-12-19 NOTE — EKG
Test Reason : 

Blood Pressure : ***/*** mmHG

Vent. Rate : 085 BPM     Atrial Rate : 085 BPM

   P-R Int : 182 ms          QRS Dur : 090 ms

    QT Int : 406 ms       P-R-T Axes : 035 007 060 degrees

   QTc Int : 483 ms

 

Normal sinus rhythm

Confirmed by JABARI DE LUNA DO ([id number]),  BHAKTI SRINIVASAN ([id number]) on 12/19/2020 2:46:11 PM

 

Referred By:  CALIXTO           Confirmed By:JABARI DE LUNA DO

## 2021-04-12 NOTE — MRI
EXAM: MRI of the brain without contrast



HISTORY: Headache and blurry vision



COMPARISON: None



TECHNIQUE: Multiplanar multisequence MR images were obtained of the brain without IV contrast.



FINDINGS:

There is a wedge-shaped area of high FLAIR signal and restricted diffusion in the right parieto-occip
ital region. Other scattered foci of high FLAIR signal in the subcortical and periventricular white

matter are likely secondary to small vessel ischemic disease.

No midline shift or downward herniation.

No hydronephrosis.

No extra-axial fluid collection or intracranial hemorrhage.



The expected flow voids are present.

Corpus callosum, pituitary, and craniocervical junction are within normal limits.



The calvarium and overlying soft tissues are unremarkable.

A mucus retention cyst is seen in the left maxillary sinus. The other paranasal sinuses and mastoid a
ir cells are well aerated.



IMPRESSION:

Acute right posterior cerebral artery infarction



Reported By: Daryl Reid 

Electronically Signed:  8/11/2019 1:46 PM Pt to go to GI lab. Rapid Covid test sent to lab. GI RN aware pt is unable/to lethargic to answer all pre procedure check list questions. Pt states he has no dentures/partials in mouth, no hearing aids. Pt has been NPO since 0000 4/12/2021. Necklace removed. Rings still on pt hands due to hand swelling, GI RN updated.